# Patient Record
Sex: MALE | Race: BLACK OR AFRICAN AMERICAN | NOT HISPANIC OR LATINO | Employment: UNEMPLOYED | URBAN - NONMETROPOLITAN AREA
[De-identification: names, ages, dates, MRNs, and addresses within clinical notes are randomized per-mention and may not be internally consistent; named-entity substitution may affect disease eponyms.]

---
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
## 2025-06-13 ENCOUNTER — TELEPHONE (OUTPATIENT)
Age: 20
End: 2025-06-13

## 2025-06-13 NOTE — TELEPHONE ENCOUNTER
Tried to called pt to figure out which laterality we are looking at for pt appointment on Tuesday. Wouldn't let me leave a message